# Patient Record
Sex: FEMALE
[De-identification: names, ages, dates, MRNs, and addresses within clinical notes are randomized per-mention and may not be internally consistent; named-entity substitution may affect disease eponyms.]

---

## 2023-03-27 PROBLEM — Z00.00 ENCOUNTER FOR PREVENTIVE HEALTH EXAMINATION: Status: ACTIVE | Noted: 2023-03-27

## 2023-03-27 RX ORDER — ACETAMINOPHEN 325 MG/1
325 TABLET, FILM COATED ORAL
Qty: 40 | Refills: 0 | Status: ACTIVE | COMMUNITY
Start: 2023-03-27

## 2023-03-27 RX ORDER — LORATADINE 10 MG
17 TABLET,DISINTEGRATING ORAL
Refills: 0 | Status: ACTIVE | COMMUNITY
Start: 2023-03-27

## 2023-03-28 ENCOUNTER — APPOINTMENT (OUTPATIENT)
Dept: GERIATRICS | Facility: ASSISTED LIVING FACILITY | Age: 83
End: 2023-03-28
Payer: MEDICARE

## 2023-03-28 VITALS — SYSTOLIC BLOOD PRESSURE: 120 MMHG | HEART RATE: 70 BPM | DIASTOLIC BLOOD PRESSURE: 50 MMHG

## 2023-03-28 DIAGNOSIS — G47.00 INSOMNIA, UNSPECIFIED: ICD-10-CM

## 2023-03-28 PROCEDURE — 99344 HOME/RES VST NEW MOD MDM 60: CPT

## 2023-03-28 NOTE — PHYSICAL EXAM
[Alert] : alert [No Acute Distress] : in no acute distress [Sclera] : the sclera and conjunctiva were normal [Normal Outer Ear/Nose] : the ears and nose were normal in appearance [Normal] : normal skin color and pigmentation [No Focal Deficits] : no focal deficits [Normal Affect] : the affect was normal [de-identified] : 1+ edema [de-identified] : oriented to person only

## 2023-03-28 NOTE — HISTORY OF PRESENT ILLNESS
[Full assistance needed] : Assistance needed managing medications [] : Assistance needed managing finances. [FreeTextEntry1] : The pt is a 81 yo female seen on move in to the [facility]. She was seen in her home. The pt has advanced dementia. History if from admission paperwork. Note pmh of bronchiectasis and chronic MOY. Also ckd. The pt is ambualtory and ghas no complaints.

## 2023-03-28 NOTE — REVIEW OF SYSTEMS
[Constipation] : constipation [Negative] : Heme/Lymph [Shortness Of Breath] : no shortness of breath [Abdominal Pain] : no abdominal pain

## 2023-03-29 ENCOUNTER — LABORATORY RESULT (OUTPATIENT)
Age: 83
End: 2023-03-29

## 2023-03-30 ENCOUNTER — NON-APPOINTMENT (OUTPATIENT)
Age: 83
End: 2023-03-30

## 2023-05-16 ENCOUNTER — APPOINTMENT (OUTPATIENT)
Dept: GERIATRICS | Facility: ASSISTED LIVING FACILITY | Age: 83
End: 2023-05-16
Payer: MEDICARE

## 2023-05-16 VITALS — DIASTOLIC BLOOD PRESSURE: 90 MMHG | SYSTOLIC BLOOD PRESSURE: 124 MMHG

## 2023-05-16 DIAGNOSIS — D64.9 ANEMIA, UNSPECIFIED: ICD-10-CM

## 2023-05-16 DIAGNOSIS — M81.0 AGE-RELATED OSTEOPOROSIS W/OUT CURRENT PATHOLOGICAL FRACTURE: ICD-10-CM

## 2023-05-16 PROCEDURE — 99348 HOME/RES VST EST LOW MDM 30: CPT

## 2023-05-16 NOTE — PHYSICAL EXAM
[Alert] : alert [No Acute Distress] : in no acute distress [Sclera] : the sclera and conjunctiva were normal [PERRL] : pupils were equal in size, round, and reactive to light [Normal] : posterior cervical, supraclavicular, anterior cervical, supraclavicular, axillary [No Joint Swelling] : no joint swelling seen [Normal Color / Pigmentation] : normal skin color and pigmentation [No Focal Deficits] : no focal deficits [de-identified] : Trace edema [de-identified] : Trace edema

## 2023-05-16 NOTE — PHYSICAL EXAM
[Alert] : alert [No Acute Distress] : in no acute distress [Sclera] : the sclera and conjunctiva were normal [PERRL] : pupils were equal in size, round, and reactive to light [Normal] : posterior cervical, supraclavicular, anterior cervical, supraclavicular, axillary [No Joint Swelling] : no joint swelling seen [Normal Color / Pigmentation] : normal skin color and pigmentation [No Focal Deficits] : no focal deficits [de-identified] : Trace edema [de-identified] : Trace edema

## 2023-05-18 NOTE — HISTORY OF PRESENT ILLNESS
[FreeTextEntry1] : The patient is an 82-year-old woman who was seen as a new visit in her home at the [place] on March 28, 2023.  The patient lives on the memory disorder unit.  Her past medical history is significant for bronchiectasis, MOY, chronic kidney disease, atrial fibrillation, severe dementia.  The patient is dependent in her ADLs.  The patient's only relative is Kelsea Galdamez.  There is a court appointed guardian.  The patient has made a good adjustment to the memory disorder unit.

## 2023-05-18 NOTE — HISTORY OF PRESENT ILLNESS
[FreeTextEntry1] : The patient is an 82-year-old woman who was seen as a new visit in her home at the [place] on March 28, 2023.  The patient lives on the memory disorder unit.  Her past medical history is significant for bronchiectasis, MOY, chronic kidney disease, atrial fibrillation, severe dementia.  The patient is dependent in her ADLs.  The patient's only relative is Kelsea Galdamez.  There is a court appointed guardian.  The patient has made a good adjustment to the memory disorder unit.

## 2023-07-25 ENCOUNTER — APPOINTMENT (OUTPATIENT)
Dept: GERIATRICS | Facility: ASSISTED LIVING FACILITY | Age: 83
End: 2023-07-25
Payer: MEDICARE

## 2023-07-25 VITALS — DIASTOLIC BLOOD PRESSURE: 57 MMHG | SYSTOLIC BLOOD PRESSURE: 117 MMHG

## 2023-07-25 PROCEDURE — 99348 HOME/RES VST EST LOW MDM 30: CPT

## 2023-07-25 NOTE — HISTORY OF PRESENT ILLNESS
[FreeTextEntry1] : The patient is an 82-year-old woman with a history of significant dementia.  She was seen at her home in the [place].  She was last seen by myself on May 16, 2023.  The patient has no children and has a court appointed guardian.  She states she feels well.  She has no chest pain shortness of breath or palpitations.

## 2023-07-25 NOTE — PHYSICAL EXAM
[Alert] : alert [No Acute Distress] : in no acute distress [Normal] : no focal deficits [No Focal Deficits] : no focal deficits [Normal Affect] : the affect was normal [Normal Insight/Judgment] : insight and judgment were not intact

## 2023-07-25 NOTE — ASSESSMENT
[FreeTextEntry1] : The patient is a 83-year-old woman with moderate to advanced dementia.  She has a court appointed guardian who was involved in her moving to the [place].  She has no children.  She does have several close friends that come to visit her frequently.  She is calm without severe behavioral issues.  Her medical history is significant for bronchiectasis, MOY, atrial fibrillation.  We will proceed as previously outlined.

## 2023-10-17 ENCOUNTER — APPOINTMENT (OUTPATIENT)
Dept: GERIATRICS | Facility: ASSISTED LIVING FACILITY | Age: 83
End: 2023-10-17
Payer: MEDICARE

## 2023-10-17 PROCEDURE — 99348 HOME/RES VST EST LOW MDM 30: CPT

## 2023-10-19 VITALS — DIASTOLIC BLOOD PRESSURE: 59 MMHG | SYSTOLIC BLOOD PRESSURE: 127 MMHG

## 2024-01-16 ENCOUNTER — APPOINTMENT (OUTPATIENT)
Dept: GERIATRICS | Facility: ASSISTED LIVING FACILITY | Age: 84
End: 2024-01-16
Payer: SELF-PAY

## 2024-01-16 VITALS — DIASTOLIC BLOOD PRESSURE: 59 MMHG | SYSTOLIC BLOOD PRESSURE: 119 MMHG

## 2024-01-16 DIAGNOSIS — R60.9 EDEMA, UNSPECIFIED: ICD-10-CM

## 2024-01-16 PROCEDURE — 99348 HOME/RES VST EST LOW MDM 30: CPT

## 2024-01-16 NOTE — PHYSICAL EXAM
[Alert] : alert [Normal] : no focal deficits [No Focal Deficits] : no focal deficits [Normal Affect] : the affect was normal [Normal Insight/Judgment] : insight and judgment were not intact [de-identified] : trace edema

## 2024-03-19 ENCOUNTER — APPOINTMENT (OUTPATIENT)
Dept: GERIATRICS | Facility: ASSISTED LIVING FACILITY | Age: 84
End: 2024-03-19
Payer: MEDICARE

## 2024-03-19 VITALS — SYSTOLIC BLOOD PRESSURE: 130 MMHG | HEART RATE: 72 BPM | DIASTOLIC BLOOD PRESSURE: 80 MMHG

## 2024-03-19 DIAGNOSIS — N18.9 CHRONIC KIDNEY DISEASE, UNSPECIFIED: ICD-10-CM

## 2024-03-19 PROCEDURE — 99348 HOME/RES VST EST LOW MDM 30: CPT

## 2024-03-19 PROCEDURE — G2211 COMPLEX E/M VISIT ADD ON: CPT | Mod: NC,1L

## 2024-03-19 NOTE — HISTORY OF PRESENT ILLNESS
[FreeTextEntry1] : The patient is an 83-year-old woman who is being seen at her home in the [place].  She was last seen by myself on January 16, 2024.  The patient has a history of significant dementia.  She lives in the memory disorder unit at the [place].  The patient has no children and has a court appointed guardian.  She is ambulatory.  She has significant cognitive deficits.  Her past medical history is significant for hypertension, atrial fibrillation, anemia and pulmonary MOY.  The patient is ambulatory without difficulty.  She states that she feels well and denies chest pain shortness of breath or palpitations.

## 2024-03-19 NOTE — PHYSICAL EXAM
[Alert] : alert [Normal] : no focal deficits [No Focal Deficits] : no focal deficits [Normal Affect] : the affect was normal [Normal Insight/Judgment] : insight and judgment were not intact [de-identified] : trace edema

## 2024-03-28 RX ORDER — AMLODIPINE BESYLATE 10 MG/1
10 TABLET ORAL
Qty: 90 | Refills: 3 | Status: ACTIVE | COMMUNITY
Start: 2023-03-27 | End: 1900-01-01

## 2024-03-28 RX ORDER — METOPROLOL SUCCINATE 25 MG/1
25 TABLET, EXTENDED RELEASE ORAL
Qty: 90 | Refills: 0 | Status: ACTIVE | COMMUNITY
Start: 2023-03-27 | End: 1900-01-01

## 2024-04-04 RX ORDER — GLUCOSAMINE HCL/CHONDROITIN SU 500-400 MG
3 CAPSULE ORAL
Qty: 90 | Refills: 0 | Status: ACTIVE | COMMUNITY
Start: 2023-03-27 | End: 1900-01-01

## 2024-06-20 RX ORDER — APIXABAN 2.5 MG/1
2.5 TABLET, FILM COATED ORAL
Qty: 60 | Refills: 3 | Status: ACTIVE | COMMUNITY
Start: 2023-03-27 | End: 1900-01-01

## 2024-06-25 ENCOUNTER — APPOINTMENT (OUTPATIENT)
Dept: GERIATRICS | Facility: ASSISTED LIVING FACILITY | Age: 84
End: 2024-06-25
Payer: MEDICARE

## 2024-06-25 VITALS — DIASTOLIC BLOOD PRESSURE: 80 MMHG | SYSTOLIC BLOOD PRESSURE: 140 MMHG | HEART RATE: 72 BPM

## 2024-06-25 DIAGNOSIS — K59.00 CONSTIPATION, UNSPECIFIED: ICD-10-CM

## 2024-06-25 DIAGNOSIS — F03.90 UNSPECIFIED DEMENTIA W/OUT BEHAVIORAL DISTURBANCE: ICD-10-CM

## 2024-06-25 DIAGNOSIS — I48.91 UNSPECIFIED ATRIAL FIBRILLATION: ICD-10-CM

## 2024-06-25 DIAGNOSIS — J47.9 BRONCHIECTASIS, UNCOMPLICATED: ICD-10-CM

## 2024-06-25 DIAGNOSIS — A31.0 PULMONARY MYCOBACTERIAL INFECTION: ICD-10-CM

## 2024-06-25 DIAGNOSIS — I10 ESSENTIAL (PRIMARY) HYPERTENSION: ICD-10-CM

## 2024-06-25 PROCEDURE — 99348 HOME/RES VST EST LOW MDM 30: CPT

## 2024-06-25 PROCEDURE — G2211 COMPLEX E/M VISIT ADD ON: CPT | Mod: NC,1L

## 2024-09-03 ENCOUNTER — APPOINTMENT (OUTPATIENT)
Dept: GERIATRICS | Facility: ASSISTED LIVING FACILITY | Age: 84
End: 2024-09-03
Payer: MEDICARE

## 2024-09-03 VITALS — SYSTOLIC BLOOD PRESSURE: 138 MMHG | DIASTOLIC BLOOD PRESSURE: 70 MMHG | HEART RATE: 72 BPM

## 2024-09-03 DIAGNOSIS — D64.9 ANEMIA, UNSPECIFIED: ICD-10-CM

## 2024-09-03 DIAGNOSIS — J47.9 BRONCHIECTASIS, UNCOMPLICATED: ICD-10-CM

## 2024-09-03 DIAGNOSIS — I10 ESSENTIAL (PRIMARY) HYPERTENSION: ICD-10-CM

## 2024-09-03 DIAGNOSIS — S05.01XD INJURY OF CONJUNCTIVA AND CORNEAL ABRASION W/OUT FOREIGN BODY, RIGHT EYE, SUBSEQUENT ENCOUNTER: ICD-10-CM

## 2024-09-03 DIAGNOSIS — I48.91 UNSPECIFIED ATRIAL FIBRILLATION: ICD-10-CM

## 2024-09-03 DIAGNOSIS — K59.00 CONSTIPATION, UNSPECIFIED: ICD-10-CM

## 2024-09-03 PROCEDURE — 99348 HOME/RES VST EST LOW MDM 30: CPT

## 2024-09-03 PROCEDURE — G2211 COMPLEX E/M VISIT ADD ON: CPT | Mod: NC

## 2024-09-03 NOTE — REVIEW OF SYSTEMS
[Red Eyes] : red eyes [Confused] : confusion [Negative] : Heme/Lymph [Anxiety] : no anxiety [Depression] : no depression

## 2024-09-03 NOTE — PHYSICAL EXAM
[Normal] : no focal deficits [Normal Insight/Judgment] : insight and judgment were not intact [FreeTextEntry1] : Sub-conjunctival hemorrhage of the right eye

## 2024-09-03 NOTE — HISTORY OF PRESENT ILLNESS
Pt given sputum specimen cup for sample. Pt verbalized understanding.   [FreeTextEntry1] : The patient is an 84-year-old woman who is being seen at her home in the [place].  The patient lives on the memory disorders unit.  She was last seen by myself on June 25.  I was asked to see the patient today because the staff noted "pinkeye" the patient has a history of significant dementia.  The patient has no children has a court appointed guardian.  She is ambulatory.  She has significant cognitive deficits.  Her past medical history is significant for hypertension, atrial fibrillation, anemia, pulmonary MOY.  Staff report 24 hours of blood in the eye.  The patient states she feels well.  She denies any eye pain.

## 2024-09-05 ENCOUNTER — LABORATORY RESULT (OUTPATIENT)
Age: 84
End: 2024-09-05

## 2025-01-07 ENCOUNTER — APPOINTMENT (OUTPATIENT)
Dept: GERIATRICS | Facility: ASSISTED LIVING FACILITY | Age: 85
End: 2025-01-07
Payer: MEDICARE

## 2025-01-07 VITALS — HEART RATE: 70 BPM | DIASTOLIC BLOOD PRESSURE: 80 MMHG | SYSTOLIC BLOOD PRESSURE: 128 MMHG

## 2025-01-07 DIAGNOSIS — I10 ESSENTIAL (PRIMARY) HYPERTENSION: ICD-10-CM

## 2025-01-07 DIAGNOSIS — J47.9 BRONCHIECTASIS, UNCOMPLICATED: ICD-10-CM

## 2025-01-07 DIAGNOSIS — D64.9 ANEMIA, UNSPECIFIED: ICD-10-CM

## 2025-01-07 DIAGNOSIS — I48.91 UNSPECIFIED ATRIAL FIBRILLATION: ICD-10-CM

## 2025-01-07 PROCEDURE — 99348 HOME/RES VST EST LOW MDM 30: CPT
